# Patient Record
Sex: MALE | Race: WHITE | URBAN - METROPOLITAN AREA
[De-identification: names, ages, dates, MRNs, and addresses within clinical notes are randomized per-mention and may not be internally consistent; named-entity substitution may affect disease eponyms.]

---

## 2017-06-20 NOTE — TELEPHONE ENCOUNTER
Medication: Butalbital      Date of last refill: 11/26/16 # 90  [place]    Last office visit: 11/22/16  Due back to clinic per last office note:  RTN in 1 year by 11/22/17  Date next office visit scheduled:   No

## 2017-06-22 RX ORDER — BUTALBITAL, ACETAMINOPHEN AND CAFFEINE 50; 325; 40 MG/1; MG/1; MG/1
TABLET ORAL
Qty: 90 TABLET | Refills: 0 | Status: SHIPPED
Start: 2017-06-22 | End: 2017-11-14

## 2017-11-14 ENCOUNTER — OFFICE VISIT (OUTPATIENT)
Dept: NEUROLOGY | Facility: CLINIC | Age: 62
End: 2017-11-14

## 2017-11-14 ENCOUNTER — TELEPHONE (OUTPATIENT)
Dept: NEUROLOGY | Facility: CLINIC | Age: 62
End: 2017-11-14

## 2017-11-14 VITALS
WEIGHT: 173 LBS | SYSTOLIC BLOOD PRESSURE: 170 MMHG | DIASTOLIC BLOOD PRESSURE: 82 MMHG | BODY MASS INDEX: 24 KG/M2 | RESPIRATION RATE: 18 BRPM | HEART RATE: 60 BPM

## 2017-11-14 DIAGNOSIS — G43.009 MIGRAINE WITHOUT AURA AND WITHOUT STATUS MIGRAINOSUS, NOT INTRACTABLE: ICD-10-CM

## 2017-11-14 DIAGNOSIS — G44.1 VASCULAR HEADACHE: Primary | ICD-10-CM

## 2017-11-14 PROCEDURE — 99214 OFFICE O/P EST MOD 30 MIN: CPT | Performed by: PHYSICIAN ASSISTANT

## 2017-11-14 RX ORDER — ELETRIPTAN HYDROBROMIDE 40 MG/1
TABLET, FILM COATED ORAL
Qty: 12 TABLET | Refills: 2 | Status: SHIPPED | OUTPATIENT
Start: 2017-11-14 | End: 2018-11-15

## 2017-11-14 RX ORDER — PROPRANOLOL HYDROCHLORIDE 80 MG/1
120 TABLET ORAL DAILY
COMMUNITY
End: 2017-11-14 | Stop reason: DRUGHIGH

## 2017-11-14 RX ORDER — PROPRANOLOL HYDROCHLORIDE 120 MG/1
120 CAPSULE, EXTENDED RELEASE ORAL DAILY
Refills: 3 | COMMUNITY
Start: 2017-11-09

## 2017-11-14 RX ORDER — ZOLMITRIPTAN 5 MG/1
SPRAY NASAL
Qty: 6 EACH | Refills: 2 | Status: SHIPPED | OUTPATIENT
Start: 2017-11-14 | End: 2018-01-02

## 2017-11-14 RX ORDER — BUTALBITAL, ACETAMINOPHEN AND CAFFEINE 50; 325; 40 MG/1; MG/1; MG/1
TABLET ORAL
Qty: 90 TABLET | Refills: 0 | Status: SHIPPED | OUTPATIENT
Start: 2017-11-14 | End: 2018-05-14

## 2017-11-14 NOTE — PROGRESS NOTES
Patient with increase in headache frequency. States he has weekly headaches, respond to abortive medications Butalbital and Relpax. Prescription approved for Butalbital and faxed to [facility] with receipt confirmation.

## 2017-11-14 NOTE — PATIENT INSTRUCTIONS
Refill policies:    • Allow 2-3 business days for refills; controlled substances may take longer.   • Contact your pharmacy at least 5 days prior to running out of medication and have them send an electronic request or submit request through the [facility] have a procedure or additional testing performed. [facility]) will contact your insurance carrier to obtain pre-certification or prior authorization.     Unfortunately, [facility] has seen an increase in denial of payment even though the p

## 2017-11-20 NOTE — TELEPHONE ENCOUNTER
Fax received from [facility], requesting NPI etc, faxed with last TE for MRI reason, receipt rec'd

## 2017-11-21 ENCOUNTER — OFF PREMISE (OUTPATIENT)
Dept: OTHER | Age: 62
End: 2017-11-21

## 2017-11-27 ENCOUNTER — TELEPHONE (OUTPATIENT)
Dept: NEUROLOGY | Facility: CLINIC | Age: 62
End: 2017-11-27

## 2017-11-28 ENCOUNTER — OFFICE VISIT (OUTPATIENT)
Dept: OTOLARYNGOLOGY | Age: 62
End: 2017-11-28

## 2017-11-28 VITALS
WEIGHT: 177 LBS | BODY MASS INDEX: 26.14 KG/M2 | SYSTOLIC BLOOD PRESSURE: 146 MMHG | DIASTOLIC BLOOD PRESSURE: 70 MMHG | HEART RATE: 61 BPM | OXYGEN SATURATION: 96 %

## 2017-11-28 DIAGNOSIS — J31.0 CHRONIC RHINITIS, UNSPECIFIED TYPE: Primary | ICD-10-CM

## 2017-11-28 PROCEDURE — 99214 OFFICE O/P EST MOD 30 MIN: CPT | Performed by: OTOLARYNGOLOGY

## 2017-12-05 DIAGNOSIS — G43.009 MIGRAINE WITHOUT AURA AND WITHOUT STATUS MIGRAINOSUS, NOT INTRACTABLE: Primary | ICD-10-CM

## 2017-12-05 RX ORDER — AMITRIPTYLINE HYDROCHLORIDE 25 MG/1
TABLET, FILM COATED ORAL
Qty: 90 TABLET | Refills: 2 | Status: SHIPPED | OUTPATIENT
Start: 2017-12-05 | End: 2018-09-06

## 2017-12-05 NOTE — TELEPHONE ENCOUNTER
Patient informed of MRI Brain results showed low-lying cerebellar tonsils and left frontal venous angioma. Asked him to get a copy of the images on disc for our review. He expressed full understanding.

## 2017-12-05 NOTE — TELEPHONE ENCOUNTER
Natalie Chacon is doing well on the increased dose of propranolol, would like to remain at the same dosage of amitriptyline. He also reports better BP control on the increased propranolol.     Medication: amitriptyline    Date of last refill: 11/22/16  Date last fi

## 2017-12-07 ENCOUNTER — TELEPHONE (OUTPATIENT)
Dept: NEUROLOGY | Facility: CLINIC | Age: 62
End: 2017-12-07

## 2017-12-07 DIAGNOSIS — R06.83 SNORING: Primary | ICD-10-CM

## 2017-12-07 NOTE — TELEPHONE ENCOUNTER
Patient dropped off MRI and CT discs. Discs have been uploaded, file room notified. Patient calling for results.

## 2017-12-12 NOTE — TELEPHONE ENCOUNTER
Patient informed that MRI Brain images were reviewed with Dr. Klaudia Davis and his cerebellar tonsils are low lying. Recommended consult with neurosurgery. Patient does snore at night and wife believes has has had episodes of apnea.  Will get sleep study to ev

## 2018-01-02 ENCOUNTER — OFFICE VISIT (OUTPATIENT)
Dept: SURGERY | Facility: CLINIC | Age: 63
End: 2018-01-02

## 2018-01-02 VITALS — SYSTOLIC BLOOD PRESSURE: 170 MMHG | HEART RATE: 60 BPM | DIASTOLIC BLOOD PRESSURE: 80 MMHG

## 2018-01-02 DIAGNOSIS — G93.5 CHIARI I MALFORMATION (HCC): Primary | ICD-10-CM

## 2018-01-02 PROCEDURE — 99243 OFF/OP CNSLTJ NEW/EST LOW 30: CPT | Performed by: PHYSICIAN ASSISTANT

## 2018-01-02 RX ORDER — UBIDECARENONE/VITAMIN E MIXED 100 MG-100
1 CAPSULE ORAL DAILY
COMMUNITY

## 2018-01-02 RX ORDER — EZETIMIBE 10 MG/1
10 TABLET ORAL NIGHTLY
COMMUNITY

## 2018-01-02 NOTE — PATIENT INSTRUCTIONS
Refill policies:    • Allow 2-3 business days for refills; controlled substances may take longer.   • Contact your pharmacy at least 5 days prior to running out of medication and have them send an electronic request or submit request through the [facility] have a procedure or additional testing performed. [facility]) will contact your insurance carrier to obtain pre-certification or prior authorization.     Unfortunately, [facility] has seen an increase in denial of payment even though the p

## 2018-01-02 NOTE — H&P
Neurosurgery Clinic Visit  2018    Meaghan Vo PCP:  Wallace CAMACHO 1955 MRN [id number]       CHIEF COMPLAINT:  Patient presents with:   Other: NP referred due to imaging      HISTORY OF PRESENT ILLNESS:  Meaghan Vo is a(n) 60 yebenito qhs Disp: 90 tablet Rfl: 2   Propranolol HCl  MG Oral Capsule SR 24 Hr Take 120 mg by mouth daily. Disp:  Rfl: 3   Butalbital-APAP-Caffeine -40 MG Oral Tab TAKE 1 TO 2 TABLETS BY MOUTH EVERY 6 HOURS AS NEEDED FOR HEADACHE.  MAX OF 5 TABLETS PER normocephalic. The eyes, ears, nose and throat are clear. The pupils are equal, round, and reactive to light. Hearing is intact and symmetric. Neck: Reveals no masses. Breast:  Exam is deferred.   Skin:  Exam reveals no obvious lesions or other indicat extension. Nontender to palpation over cervical/lumbar area without spasms. Negative Spurling's. Negative L'Hermitte's. REVIEW OF STUDIES:  MRI brain 11/27/2017 - low lying cerebellar tonsils, left frontal venous angioma.       ASSESSMENT AND PLAN:

## 2018-01-02 NOTE — PROGRESS NOTES
Pt is here to review imaging of head.     Pt has headaches about 1x weekly  Denies any other symptoms

## 2018-01-05 ENCOUNTER — TELEPHONE (OUTPATIENT)
Dept: SURGERY | Facility: CLINIC | Age: 63
End: 2018-01-05

## 2018-01-05 NOTE — TELEPHONE ENCOUNTER
Unique Thacker at [facility] requests we fax MRI order. Patient will have it done there. Fax # [phone]. Fax failed twice.  Left message at [phone]

## 2018-01-08 ENCOUNTER — TELEPHONE (OUTPATIENT)
Dept: NEUROLOGY | Facility: CLINIC | Age: 63
End: 2018-01-08

## 2018-01-08 DIAGNOSIS — R06.83 SNORING: Primary | ICD-10-CM

## 2018-01-09 NOTE — TELEPHONE ENCOUNTER
Received fax 1/9/18 requesting revision of attached order for MRI cervical-thoracic-lumbar. Placed in nurse bin.

## 2018-01-10 ENCOUNTER — TELEPHONE (OUTPATIENT)
Dept: SURGERY | Facility: CLINIC | Age: 63
End: 2018-01-10

## 2018-01-11 NOTE — TELEPHONE ENCOUNTER
Received request for progress notes / H&P. Office notes and TE documenting need for sleep study faxed.

## 2018-01-25 ENCOUNTER — TELEPHONE (OUTPATIENT)
Dept: NEUROLOGY | Facility: CLINIC | Age: 63
End: 2018-01-25

## 2018-01-25 NOTE — TELEPHONE ENCOUNTER
Called [facility] and spoke with Xavi Garcia she said that they have all clinicals and it is in review with a nurse and because it was marked Urgent they should have an answer by 6:00pm central time, patient can call and get information.     I called patient and gave

## 2018-01-25 NOTE — TELEPHONE ENCOUNTER
Called patient welfare [facility] and was told by Flor Rosado that a PA is needed for the sleep study. Call reference #[id number].  Time on call 8:12    Called [facility] [phone] and gave all information to Nicole Puga and asked this to be marked urgent as the test is sched

## 2018-01-26 NOTE — TELEPHONE ENCOUNTER
Pt called asking if this was approved, informed pt Nanci from [facility] gave approval, pt thankful.

## 2018-01-26 NOTE — TELEPHONE ENCOUNTER
[address] from [facility] called to give approval for Sleep Study    Approval #[id number] for 1/26/18    She said she just spoke with the patient and let him know that this was approved.

## 2018-02-09 ENCOUNTER — TELEPHONE (OUTPATIENT)
Dept: NEUROLOGY | Facility: CLINIC | Age: 63
End: 2018-02-09

## 2018-02-12 NOTE — TELEPHONE ENCOUNTER
Please call to inform him that sleep study results showed moderate sleep apnea. See if he has follow-up appointment to do cpap titration or he can follow-up with his pcp to get further recommendations.

## 2018-02-12 NOTE — TELEPHONE ENCOUNTER
Spoke with patient on phone, relayed message of sleep study results. Patient stated has appt with Dr. Keturah Ba on 2/20 and he is following up about reasoning for apnea at that time.

## 2018-02-20 ENCOUNTER — OFFICE VISIT (OUTPATIENT)
Dept: SURGERY | Facility: CLINIC | Age: 63
End: 2018-02-20

## 2018-02-20 VITALS — SYSTOLIC BLOOD PRESSURE: 160 MMHG | DIASTOLIC BLOOD PRESSURE: 70 MMHG | HEART RATE: 64 BPM

## 2018-02-20 DIAGNOSIS — G93.5 CHIARI I MALFORMATION (HCC): Primary | ICD-10-CM

## 2018-02-20 PROCEDURE — 99213 OFFICE O/P EST LOW 20 MIN: CPT | Performed by: NEUROLOGICAL SURGERY

## 2018-02-20 NOTE — PROGRESS NOTES
Neurosurgery Clinic Visit  2018    Ranjith Branham PCP:  Mike CAMACHO 1955 MRN [id number]     HISTORY OF PRESENT ILLNESS:  Ranjith Branham is a(n) 60year old male here for follow-up  He got his MRI of his CT and L-spine  He got some x

## 2018-02-20 NOTE — PATIENT INSTRUCTIONS
Refill policies:    • Allow 2-3 business days for refills; controlled substances may take longer.   • Contact your pharmacy at least 5 days prior to running out of medication and have them send an electronic request or submit request through the [facility] recommended that you have a procedure or additional testing performed. [facility]) will contact your insurance carrier to obtain pre-certification or prior authorization.     Unfortunately, [facility] has seen an increase in denial of paym

## 2018-05-14 DIAGNOSIS — G43.009 MIGRAINE WITHOUT AURA AND WITHOUT STATUS MIGRAINOSUS, NOT INTRACTABLE: Primary | ICD-10-CM

## 2018-05-14 NOTE — TELEPHONE ENCOUNTER
Medication: Butalbital -40 mg     Date of last refill: 11/14/17  [place]     Last office visit: 11/14/17  Due back to clinic per last office note:  1 year  Date next office visit scheduled:  No future appointmen

## 2018-05-15 RX ORDER — BUTALBITAL, ACETAMINOPHEN AND CAFFEINE 50; 325; 40 MG/1; MG/1; MG/1
TABLET ORAL
Qty: 90 TABLET | Refills: 0 | Status: SHIPPED
Start: 2018-05-15 | End: 2018-11-15

## 2018-09-06 DIAGNOSIS — G43.009 MIGRAINE WITHOUT AURA AND WITHOUT STATUS MIGRAINOSUS, NOT INTRACTABLE: ICD-10-CM

## 2018-09-06 NOTE — TELEPHONE ENCOUNTER
Medication: Amitriptyline    Date of last refill: 12/5/2017 (#90/2)  [place]    Last office visit: 11/14/2017  Due back to clinic per last office note:  RTC in one year  Date next office visit sche

## 2018-09-07 RX ORDER — AMITRIPTYLINE HYDROCHLORIDE 25 MG/1
TABLET, FILM COATED ORAL
Qty: 90 TABLET | Refills: 2 | Status: SHIPPED | OUTPATIENT
Start: 2018-09-07 | End: 2019-06-19

## 2018-11-15 ENCOUNTER — OFFICE VISIT (OUTPATIENT)
Dept: NEUROLOGY | Facility: CLINIC | Age: 63
End: 2018-11-15
Payer: COMMERCIAL

## 2018-11-15 VITALS
DIASTOLIC BLOOD PRESSURE: 72 MMHG | WEIGHT: 180 LBS | RESPIRATION RATE: 16 BRPM | BODY MASS INDEX: 25.2 KG/M2 | HEIGHT: 71 IN | HEART RATE: 74 BPM | SYSTOLIC BLOOD PRESSURE: 122 MMHG

## 2018-11-15 DIAGNOSIS — G43.009 MIGRAINE WITHOUT AURA AND WITHOUT STATUS MIGRAINOSUS, NOT INTRACTABLE: Primary | ICD-10-CM

## 2018-11-15 DIAGNOSIS — G47.33 OBSTRUCTIVE SLEEP APNEA: ICD-10-CM

## 2018-11-15 PROCEDURE — 99214 OFFICE O/P EST MOD 30 MIN: CPT | Performed by: PHYSICIAN ASSISTANT

## 2018-11-15 RX ORDER — AMLODIPINE BESYLATE 5 MG/1
5 TABLET ORAL DAILY
COMMUNITY

## 2018-11-15 RX ORDER — BUTALBITAL, ACETAMINOPHEN AND CAFFEINE 50; 325; 40 MG/1; MG/1; MG/1
TABLET ORAL
Qty: 90 TABLET | Refills: 0 | Status: SHIPPED | OUTPATIENT
Start: 2018-11-15 | End: 2019-04-29

## 2018-11-15 RX ORDER — ELETRIPTAN HYDROBROMIDE 40 MG/1
TABLET, FILM COATED ORAL
Qty: 4 TABLET | Refills: 5 | Status: SHIPPED | OUTPATIENT
Start: 2018-11-15

## 2018-11-15 NOTE — PATIENT INSTRUCTIONS
Refill policies:    • Allow 2-3 business days for refills; controlled substances may take longer.   • Contact your pharmacy at least 5 days prior to running out of medication and have them send an electronic request or submit request through the “request re entire amount billed. Precertification and Prior Authorizations: If your physician has recommended that you have a procedure or additional testing performed.   [facility]) will contact your insurance carrier to obtain pre-certi

## 2018-11-15 NOTE — PROGRESS NOTES
[facility] with [address]  9/14/1955  Primary Care Provider:  Davi Pastrana    11/15/2018  Accompanied visit:  (X) No ( ) yes, by:      58year old male patient being seen fo Hydrobromide (RELPAX) 40 MG Oral Tab, Take 1 tab po at onset of headache, may repeat in 2 hours if headache recurs, max 2 tabs in 24 hours, Disp: 4 tablet, Rfl: 5  •  Butalbital-APAP-Caffeine -40 MG Oral Tab, TAKE 1 TO 2 TABLETS BY MOUTH EVERY 6 HOUR large)   Pulse 74   Resp 16   Ht 71\"   Wt 180 lb   BMI 25.10 kg/m²   Looks stated age  Pink conjunctiva anicteric sclerae, moist mucosa  No LAD, neck supple  Lungs clear breath sounds  Heart S1S2, no abnormal sounds  Pink nailbeds no Cyanosis    NAD, A&Ox or authorized [employer]  (  ) other records reviewed --non F2F  (  ) Fam meetings - patient not present --non F2F  Non Face to Face CPT code 34872/01590 applies as documented above    PROCEDURE DONE     (   ) see notes  Visits are done with \"ope

## 2019-04-29 DIAGNOSIS — G43.009 MIGRAINE WITHOUT AURA AND WITHOUT STATUS MIGRAINOSUS, NOT INTRACTABLE: ICD-10-CM

## 2019-04-29 RX ORDER — BUTALBITAL, ACETAMINOPHEN AND CAFFEINE 50; 325; 40 MG/1; MG/1; MG/1
TABLET ORAL
Qty: 90 TABLET | Refills: 0 | Status: SHIPPED
Start: 2019-04-29

## 2019-04-29 NOTE — TELEPHONE ENCOUNTER
Medication: fioricet    Date of last refill: 11/15/18  [place]    Last office visit: 11/15/2018  Due back to clinic per last office note:  yearly  Date next office visit scheduled:  No future appointments.     Last OV

## 2019-06-19 DIAGNOSIS — G43.009 MIGRAINE WITHOUT AURA AND WITHOUT STATUS MIGRAINOSUS, NOT INTRACTABLE: ICD-10-CM

## 2019-06-19 RX ORDER — AMITRIPTYLINE HYDROCHLORIDE 25 MG/1
25 TABLET, FILM COATED ORAL NIGHTLY
Qty: 30 TABLET | Refills: 0 | Status: SHIPPED | OUTPATIENT
Start: 2019-06-19

## 2019-06-19 RX ORDER — AMITRIPTYLINE HYDROCHLORIDE 25 MG/1
TABLET, FILM COATED ORAL
Qty: 90 TABLET | Refills: 0 | Status: SHIPPED | OUTPATIENT
Start: 2019-06-19

## 2019-06-19 NOTE — TELEPHONE ENCOUNTER
Medication: amitriptyline (90 day and short-term supplies)    Date of last refill: 9/7/18   [place]    Last office visit: 11/15/18  Due back to clinic per last office note:  1 year  Date next office visit scheduled: